# Patient Record
Sex: MALE | Race: WHITE | Employment: FULL TIME | URBAN - METROPOLITAN AREA
[De-identification: names, ages, dates, MRNs, and addresses within clinical notes are randomized per-mention and may not be internally consistent; named-entity substitution may affect disease eponyms.]

---

## 2017-04-27 DIAGNOSIS — I10 ESSENTIAL HYPERTENSION: Primary | ICD-10-CM

## 2017-04-27 RX ORDER — AMLODIPINE AND BENAZEPRIL HYDROCHLORIDE 5; 10 MG/1; MG/1
CAPSULE ORAL
Qty: 30 CAPSULE | Refills: 0 | Status: SHIPPED | OUTPATIENT
Start: 2017-04-27 | End: 2017-05-17

## 2017-04-27 NOTE — TELEPHONE ENCOUNTER
Call pt. Not seen since 2015. WIll refill med for 30 days. Will need to be seen back in clinic in the next 30 days to review BP. If OK, will then refill med longterm as appropriate

## 2017-04-27 NOTE — TELEPHONE ENCOUNTER
Routing refill request to provider for review/approval because:  Labs out of range:  K and Cr last in 2015   Patient needs to be seen because it has been more than 1 year since last office visit.  Last office visit in 2015.

## 2017-04-27 NOTE — TELEPHONE ENCOUNTER
amLODIPine-benazepril (LOTREL) 5-10 MG per capsule      Last Written Prescription Date: 3/11/2016  Last Fill Quantity: 90, # refills: 3    Last Office Visit with [facility], [facility] or [facility] prescribing provider:  9/10/2016   Future Office Visit:        BP Readings from Last 3 Encounters:   09/10/15 114/80   03/02/15 128/80   01/30/15 138/90

## 2017-05-04 DIAGNOSIS — I10 ESSENTIAL HYPERTENSION: ICD-10-CM

## 2017-05-04 RX ORDER — AMLODIPINE AND BENAZEPRIL HYDROCHLORIDE 5; 10 MG/1; MG/1
CAPSULE ORAL
Qty: 90 CAPSULE | Refills: 3 | OUTPATIENT
Start: 2017-05-04

## 2017-05-17 DIAGNOSIS — I10 ESSENTIAL HYPERTENSION: ICD-10-CM

## 2017-05-18 RX ORDER — AMLODIPINE AND BENAZEPRIL HYDROCHLORIDE 5; 10 MG/1; MG/1
CAPSULE ORAL
Qty: 30 CAPSULE | Refills: 0 | Status: SHIPPED | OUTPATIENT
Start: 2017-05-18 | End: 2017-05-30

## 2017-05-18 NOTE — TELEPHONE ENCOUNTER
amLODIPine-benazepril (LOTREL) 5-10 MG per capsule      Last Written Prescription Date: 4/27/2017  Last Fill Quantity: 30, # refills: 0  Last Office Visit with [facility], P or [facility] prescribing provider: 9/10/2015  Next 5 appointments (look out 90 days)     May 30, 2017  3:00 PM CDT   Office Visit with Roger [facility]o MD   [facility]    [address] [place] [address] [phone]                   Potassium   Date Value Ref Range Status   02/21/2015 4.4 3.4 - 5.3 mmol/L Final     Creatinine   Date Value Ref Range Status   02/21/2015 0.85 0.66 - 1.25 mg/dL Final     BP Readings from Last 3 Encounters:   09/10/15 114/80   03/02/15 128/80   01/30/15 138/90

## 2017-05-18 NOTE — TELEPHONE ENCOUNTER
Routing refill request to provider for review/approval because:  Pamela given x1 and patient did not follow up, please advise  Labs not current:  K and Cr   Patient needs to be seen because it has been more than 1 year since last office visit.

## 2017-05-19 NOTE — TELEPHONE ENCOUNTER
Not seen since 2015. Was instructed in April of need for appt. Has now scheduled appt for 5/30/17. Will refill BP med for 30 days only  to cover until pt seen. Will address more longterm refills at that time as appropriate based on BP reading then and physical exam. Inform pt

## 2017-05-30 ENCOUNTER — OFFICE VISIT (OUTPATIENT)
Dept: INTERNAL MEDICINE | Facility: CLINIC | Age: 52
End: 2017-05-30
Payer: COMMERCIAL

## 2017-05-30 VITALS
HEIGHT: 70 IN | WEIGHT: 239 LBS | SYSTOLIC BLOOD PRESSURE: 112 MMHG | BODY MASS INDEX: 34.22 KG/M2 | DIASTOLIC BLOOD PRESSURE: 76 MMHG | HEART RATE: 84 BPM | TEMPERATURE: 98.5 F | OXYGEN SATURATION: 97 %

## 2017-05-30 DIAGNOSIS — I10 ESSENTIAL HYPERTENSION: ICD-10-CM

## 2017-05-30 DIAGNOSIS — Z11.59 NEED FOR HEPATITIS C SCREENING TEST: ICD-10-CM

## 2017-05-30 DIAGNOSIS — Z12.5 SCREENING FOR PROSTATE CANCER: Primary | ICD-10-CM

## 2017-05-30 DIAGNOSIS — E66.9 NON MORBID OBESITY, UNSPECIFIED OBESITY TYPE: ICD-10-CM

## 2017-05-30 LAB
ANION GAP SERPL CALCULATED.3IONS-SCNC: 10 MMOL/L (ref 3–14)
BUN SERPL-MCNC: 18 MG/DL (ref 7–30)
CALCIUM SERPL-MCNC: 8.8 MG/DL (ref 8.5–10.1)
CHLORIDE SERPL-SCNC: 108 MMOL/L (ref 94–109)
CO2 SERPL-SCNC: 25 MMOL/L (ref 20–32)
CREAT SERPL-MCNC: 0.85 MG/DL (ref 0.66–1.25)
GFR SERPL CREATININE-BSD FRML MDRD: NORMAL ML/MIN/1.7M2
GLUCOSE SERPL-MCNC: 85 MG/DL (ref 70–99)
POTASSIUM SERPL-SCNC: 4 MMOL/L (ref 3.4–5.3)
PSA SERPL-ACNC: 0.47 UG/L (ref 0–4)
SODIUM SERPL-SCNC: 143 MMOL/L (ref 133–144)

## 2017-05-30 PROCEDURE — 36415 COLL VENOUS BLD VENIPUNCTURE: CPT | Performed by: INTERNAL MEDICINE

## 2017-05-30 PROCEDURE — G0103 PSA SCREENING: HCPCS | Performed by: INTERNAL MEDICINE

## 2017-05-30 PROCEDURE — 99214 OFFICE O/P EST MOD 30 MIN: CPT | Performed by: INTERNAL MEDICINE

## 2017-05-30 PROCEDURE — 80048 BASIC METABOLIC PNL TOTAL CA: CPT | Performed by: INTERNAL MEDICINE

## 2017-05-30 PROCEDURE — 86803 HEPATITIS C AB TEST: CPT | Performed by: INTERNAL MEDICINE

## 2017-05-30 RX ORDER — AMLODIPINE AND BENAZEPRIL HYDROCHLORIDE 5; 10 MG/1; MG/1
1 CAPSULE ORAL DAILY
Qty: 90 CAPSULE | Refills: 3 | Status: SHIPPED | OUTPATIENT
Start: 2017-05-30 | End: 2018-06-12

## 2017-05-30 NOTE — MR AVS SNAPSHOT
"              After Visit Summary   5/30/2017    Rhys Manley    MRN: [id number]           Patient Information     Date Of Birth          1965        Visit Information        Provider Department      5/30/2017 3:00 PM Roger Brito MD [facility]        Today's Diagnoses     Screening for prostate cancer    -  1    Essential hypertension        Need for hepatitis C screening test        Non morbid obesity, unspecified obesity type          Care Instructions    Continue current meds  Prescriptions refilled.    Labs today as ordered  Calorie/carbohydrate (sugar, bread, potato, pasta, etc) reduction in diet for weight loss.  Increase color on your plate with fruits and vegetables. Increase  frequency of walking or other aerobic exercise as able (goal is daily)  See me in 1 year, earlier as needed                 Follow-ups after your visit        Who to contact     If you have questions or need follow up information about today's clinic visit or your schedule please contact [facility] directly at [phone].  Normal or non-critical lab and imaging results will be communicated to you by [employer], letter or phone within 4 business days after the clinic has received the results. If you do not hear from us within 7 days, please contact the clinic through [employer] or phone. If you have a critical or abnormal lab result, we will notify you by phone as soon as possible.  Submit refill requests through [employer] or call your pharmacy and they will forward the refill request to us. Please allow 3 business days for your refill to be completed.          Additional Information About Your Visit        [employer] Information     [employer] lets you send messages to your doctor, view your test results, renew your prescriptions, schedule appointments and more. To sign up, go to [email or web address] . Click on \"Log in\" on the left side of the screen, which will take you to the Welcome " "page. Then click on \"Sign up Now\" on the right side of the page.     You will be asked to enter the access code listed below, as well as some personal information. Please follow the directions to create your username and password.     Your access code is: [email or web address]  Expires: 2017  3:31 PM     Your access code will  in 90 days. If you need help or a new code, please call your [place] clinic or [phone].        Care EveryWhere ID     This is your Care EveryWhere ID. This could be used by other organizations to access your [place] medical records  [id number]        Your Vitals Were     Pulse Temperature Height Pulse Oximetry BMI (Body Mass Index)       84 98.5  F (36.9  C) (Oral) 5' 10\" (1.778 m) 97% 34.29 kg/m2        Blood Pressure from Last 3 Encounters:   17 112/76   09/10/15 114/80   03/02/15 128/80    Weight from Last 3 Encounters:   17 239 lb (108.4 kg)   09/10/15 237 lb (107.5 kg)   03/02/15 237 lb (107.5 kg)              We Performed the Following     Basic metabolic panel     Hepatitis C Screen Reflex to HCV RNA Quant and Genotype     Prostate spec antigen screen          Today's Medication Changes          These changes are accurate as of: 17  3:32 PM.  If you have any questions, ask your nurse or doctor.               These medicines have changed or have updated prescriptions.        Dose/Directions    amLODIPine-benazepril 5-10 MG per capsule   Commonly known as:  LOTREL   This may have changed:  See the new instructions.   Used for:  Essential hypertension   Changed by:  Roger Brito MD        Dose:  1 capsule   Take 1 capsule by mouth daily   Quantity:  90 capsule   Refills:  3         Stop taking these medicines if you haven't already. Please contact your care team if you have questions.     fluticasone 50 MCG/ACT spray   Commonly known as:  FLONASE   Stopped by:  Roger rBito MD           PROVENTIL  (90 BASE) MCG/ACT Inhaler   Generic drug:  albuterol "   Stopped by:  Roger Brito MD           valACYclovir 1000 mg tablet   Commonly known as:  VALTREX   Stopped by:  Roger Brito MD                Where to get your medicines      These medications were sent to [place] [facility] [address] [facility] - [place], [place] - [address]  [address], [facility] [address]     Phone:  [phone]     amLODIPine-benazepril 5-10 MG per capsule                Primary Care Provider Office Phone # Fax #    Roger Brito [phone]       [facility] [address]  [facility] [address]        Thank you!     Thank you for choosing [facility]  for your care. Our goal is always to provide you with excellent care. Hearing back from our patients is one way we can continue to improve our services. Please take a few minutes to complete the written survey that you may receive in the mail after your visit with us. Thank you!             Your Updated Medication List - Protect others around you: Learn how to safely use, store and throw away your medicines at www.disposemymeds.org.          This list is accurate as of: 5/30/17  3:32 PM.  Always use your most recent med list.                   Brand Name Dispense Instructions for use    amLODIPine-benazepril 5-10 MG per capsule    LOTREL    90 capsule    Take 1 capsule by mouth daily       MULTIVITAMINS PO      Take  by mouth daily.       triamcinolone 0.1 % cream    KENALOG    45 g    Apply sparingly to affected area twice a day as needed for rash

## 2017-05-30 NOTE — NURSING NOTE
"Chief Complaint   Patient presents with     Hypertension       Initial /76  Pulse 100  Temp 99.3  F (37.4  C) (Oral)  Ht 5' 10\" (1.778 m)  Wt 239 lb (108.4 kg)  SpO2 97%  BMI 34.29 kg/m2 Estimated body mass index is 34.29 kg/(m^2) as calculated from the following:    Height as of this encounter: 5' 10\" (1.778 m).    Weight as of this encounter: 239 lb (108.4 kg).  Medication Reconciliation: complete  "

## 2017-05-30 NOTE — PATIENT INSTRUCTIONS
Continue current meds  Prescriptions refilled.    Labs today as ordered  Calorie/carbohydrate (sugar, bread, potato, pasta, etc) reduction in diet for weight loss.  Increase color on your plate with fruits and vegetables. Increase  frequency of walking or other aerobic exercise as able (goal is daily)  See me in 1 year, earlier as needed

## 2017-05-30 NOTE — LETTER
[facility]  [address] [address]  [phone]      6/3/2017       Rhys Manley  [address] [facility] [address]        Dear Rhys,  Enclosed are a printed copy of your most recent  lab results.   Unless commented on below, mild variation of results  outside the normal range are not clinically signicant.    Electrolyte, Glucose/Sugar, Kidney function and Prostate cancer screening lab results were normal.  No evidence of hepatitis C infection.  Continue current medication.  Please contact your pharmacy if you need further refills of your medication.  Please make an appointment to see me in 1 year  for follow-up of your blood pressure or earlier as needed.    If you have further questions/concerns regarding the results, I would ask that you bring them to your next follow-up appointment with me and I would be happy to review them with you further.        Sincerely,      Roger Brito MD  Internal Medicine

## 2017-05-30 NOTE — PROGRESS NOTES
"  SUBJECTIVE:                                                    Rhys Manley is a 52 year old male who presents to clinic today for the following health issues:      Hypertension Follow-up      Outpatient blood pressures are not being checked.    Low Salt Diet: no added salt       Amount of exercise or physical activity: 6-7 days/week for an average of 45-60 minutes    Problems taking medications regularly: No    Medication side effects: none    Diet: regular (no restrictions)    Pt's past medical history, family history, habits, medications and allergies were reviewed with the patient today.  See snap shot for  HCM status. Most recent lab results reviewed with pt. Problem list and histories reviewed & adjusted, as indicated.  Additional history as below:    Denies chest pain, shortness of breath, abdominal pain, headache, vision changes or side effects with medications.  Weight up 2 pounds in 2 years. Not following particular diet.  Exercise as above     Additional ROS:   Constitutional, HEENT, Cardiovascular, Pulmonary, GI and , Neuro, MSK and Psych review of systems/symptoms are otherwise negative or unchanged from previous, except as noted above.      OBJECTIVE:  /76  Pulse 84  Temp 98.5  F (36.9  C) (Oral)  Ht 5' 10\" (1.778 m)  Wt 239 lb (108.4 kg)  SpO2 97%  BMI 34.29 kg/m2   Estimated body mass index is 34.29 kg/(m^2) as calculated from the following:    Height as of this encounter: 5' 10\" (1.778 m).    Weight as of this encounter: 239 lb (108.4 kg).  Eye: PERRL, EOMI  HENT: ear canals and TM's normal and nose and mouth without ulcers or lesions   Neck: no adenopathy. Thyroid normal to palpation. No bruits  Pulm: Lungs clear to auscultation   CV: Regular rates and rhythm  GI: Soft, obese, nontender, Normal active bowel sounds, No hepatosplenomegaly or masses palpable  Ext: Peripheral pulses intact. No edema.  Neuro: Normal strength and tone, sensory exam grossly normal  Rectal: prostate " symmetric w/o nodularity, no masses palpated, stool guaiac negative and prostate 1+       Assessment/Plan: (See plan discussion below for further details)  1. Essential hypertension  Well-controlled. Continue current medication  - amLODIPine-benazepril (LOTREL) 5-10 MG per capsule; Take 1 capsule by mouth daily  Dispense: 90 capsule; Refill: 3  - Basic metabolic panel    2. Screening for prostate cancer   PSA ordered for intermittent monitoring after discussion with pt and pt preference. Not doing annual PSA per USPTF recs  - Prostate spec antigen screen    3. Need for hepatitis C screening test  Pt meets criteria for hepatitis C screening based on birth year 1946-65. Discussed pro/cons of Hep C screening/treatment and recs of USPTF. Pt agreeable to screening  - Hepatitis C Screen Reflex to HCV RNA Quant and Genotype    4. Non morbid obesity, unspecified obesity type  See below regarding counseling for diet and exercise    Plan discussion:  Continue current meds  Prescriptions refilled.    Labs today as ordered  Calorie/carbohydrate (sugar, bread, potato, pasta, etc) reduction in diet for weight loss.  Increase color on your plate with fruits and vegetables. Increase  frequency of walking or other aerobic exercise as able (goal is daily)  See me in 1 year, earlier as needed       Roger Brito MD  Internal Medicine Department  [facility]

## 2017-06-01 LAB — HCV AB SERPL QL IA: NORMAL

## 2017-06-20 DIAGNOSIS — L30.9 ECZEMA: ICD-10-CM

## 2017-06-21 RX ORDER — TRIAMCINOLONE ACETONIDE 1 MG/G
CREAM TOPICAL
Qty: 45 G | Refills: 1 | Status: SHIPPED | OUTPATIENT
Start: 2017-06-21 | End: 2020-01-23

## 2018-06-12 DIAGNOSIS — I10 ESSENTIAL HYPERTENSION: ICD-10-CM

## 2018-06-12 RX ORDER — AMLODIPINE AND BENAZEPRIL HYDROCHLORIDE 5; 10 MG/1; MG/1
CAPSULE ORAL
Qty: 30 CAPSULE | Refills: 0 | Status: SHIPPED | OUTPATIENT
Start: 2018-06-12 | End: 2018-08-19

## 2018-06-12 NOTE — LETTER
[facility]  [address] [id number] [phone]            Rhys Manley  [address] [facility] [address]        June 12, 2018    Dear Rhys,    While refilling your prescription today, we noticed that you are due for an appointment with your provider.  We will refill your prescription for 30 days, but a follow-up appointment must be made before any additional refills can be approved.     Taking care of your health is important to us and we look forward to seeing you in the near future.  Please call us at [phone] or [phone] (or use Stylyt) to schedule an appointment.     Please disregard this notice if you have already made an appointment.    Sincerely,        [facility]

## 2018-08-19 DIAGNOSIS — I10 ESSENTIAL HYPERTENSION: ICD-10-CM

## 2018-08-19 NOTE — TELEPHONE ENCOUNTER
"Requested Prescriptions   Pending Prescriptions Disp Refills     amLODIPine-benazepril (LOTREL) 5-10 MG per capsule [Pharmacy Med Name: AMLODIPINE-BENAZEPRIL 5-10 MG] 30 capsule 0    Last Written Prescription Date:  6/12/2018  Last Fill Quantity: 30,  # refills: 0   Last office visit: 5/30/2017 with prescribing provider:  5/30/2017   Future Office Visit:     Sig: TAKE 1 CAPSULE BY MOUTH EVERY DAY    Calcium Channel Blockers Protocol  Failed    8/19/2018  1:26 AM       Failed - Blood pressure under 140/90 in past 12 months    BP Readings from Last 3 Encounters:   05/30/17 112/76   09/10/15 114/80   03/02/15 128/80                Failed - Recent (12 mo) or future (30 days) visit within the authorizing provider's specialty    Patient had office visit in the last 12 months or has a visit in the next 30 days with authorizing provider or within the authorizing provider's specialty.  See \"Patient Info\" tab in inbasket, or \"Choose Columns\" in Meds & Orders section of the refill encounter.           Failed - Normal serum creatinine on file in past 12 months    Recent Labs   Lab Test  05/30/17   1537   CR  0.85            Passed - Patient is age 18 or older          "

## 2018-08-21 NOTE — TELEPHONE ENCOUNTER
Last seen in May 2017. Med refilled 2 mos ago for 30 days and pt sent letter stating need for appt in the next 30 days. No appt made and pt out of med > 1 month ago if had been using daily when refilled in June. Call pt to schedule appt with me in the next 30 days and then route RF request back to me to address medication until seen back in clinic

## 2018-08-21 NOTE — TELEPHONE ENCOUNTER
Routing refill request to provider for review/approval because:  Pamela given x1 and patient did not follow up, please advise

## 2018-08-23 DIAGNOSIS — I10 ESSENTIAL HYPERTENSION: ICD-10-CM

## 2018-08-23 RX ORDER — AMLODIPINE AND BENAZEPRIL HYDROCHLORIDE 5; 10 MG/1; MG/1
CAPSULE ORAL
Qty: 30 CAPSULE | Refills: 0 | Status: CANCELLED | OUTPATIENT
Start: 2018-08-23

## 2018-08-23 RX ORDER — AMLODIPINE AND BENAZEPRIL HYDROCHLORIDE 5; 10 MG/1; MG/1
CAPSULE ORAL
Qty: 30 CAPSULE | Refills: 0 | Status: SHIPPED | OUTPATIENT
Start: 2018-08-23 | End: 2018-09-10 | Stop reason: DRUGHIGH

## 2018-08-23 NOTE — TELEPHONE ENCOUNTER
/Requested Prescriptions/   Pending Prescriptions Disp Refills     amLODIPine-benazepril (LOTREL) 5-10 MG per capsule 30 capsule 0    There is no refill protocol information for this order      Last Written Prescription Date:  06/12/18  Last Fill Quantity: 30,  # refills: 0   Last office visit: 5/30/2017 with prescribing provider:  05/30/17   Future Office Visit: 09/10/18  Next 5 appointments (look out 90 days)     Sep 10, 2018  3:00 PM CDT   Office Visit with Roger Brito MD   [facility]    [address] [address] [phone]

## 2018-09-06 NOTE — PROGRESS NOTES
"  SUBJECTIVE:   Rhys Manley is a 53 year old male who presents to clinic today for the following health issues:    Chief Complaint   Patient presents with     Hypertension       Hypertension Follow-up      Outpatient blood pressures are not being checked.    Low Salt Diet: no added salt      Amount of exercise or physical activity: Work is Physical    Problems taking medications regularly: No    Medication side effects: none    Diet: no added salt      Pt's past medical history, family history, habits, medications and allergies were reviewed with the patient today.  See snap shot for  HCM status. Most recent lab results reviewed with pt. Problem list and histories reviewed & adjusted, as indicated.  Additional history as below:    Denies chest pain, shortness of breath, abdominal pain, vision changes or side effects with medications. Very rare headache. Has some stress at home  with relationship  between wife and son.   Denies actual depression   Occ snoring. Mild fatigue in the day. No naps     Additional ROS:   Constitutional, HEENT, Cardiovascular, Pulmonary, GI and , Neuro, MSK and Psych review of systems/symptoms are otherwise negative or unchanged from previous, except as noted above.      OBJECTIVE:  /88  Pulse 94  Temp 99.5  F (37.5  C) (Oral)  Resp 16  Wt 243 lb (110.2 kg)  SpO2 99%  BMI 34.87 kg/m2   Estimated body mass index is 34.87 kg/(m^2) as calculated from the following:    Height as of 5/30/17: 5' 10\" (1.778 m).    Weight as of this encounter: 243 lb (110.2 kg).  Eye: PERRL, EOMI  HENT: ear canals and TM's normal and nose and mouth without ulcers or lesions. Mld airway narrowing due to weight   Neck: no adenopathy. Thyroid normal to palpation. No bruits  Pulm: Lungs clear to auscultation   CV: obese, Regular rates and rhythm  GI: Soft, nontender, Normal active bowel sounds, No hepatosplenomegaly or masses palpable  Ext: Peripheral pulses intact. No edema.  Neuro: Normal strength and " tone, sensory exam grossly normal  Gen:: Normal affect      Assessment/Plan: (See plan discussion below for further details)  1. Essential hypertension  Needs improved control. See plan discussion below.   - Lipid panel reflex to direct LDL Fasting; Future  - Comprehensive metabolic panel; Future  - amLODIPine-benazepril (LOTREL) 5-20 MG per capsule; Take 1 capsule by mouth daily  Dispense: 30 capsule; Refill: 11    2. Class 1 obesity with serious comorbidity and body mass index (BMI) of 34.0 to 34.9 in adult, unspecified obesity type  See below re: counseling of diet/exercise    3. Screening for HIV (human immunodeficiency virus)   HIV screening recommendation per USPSTF guideline discussed with pt. Pt agrees.   - HIV Screening; Future    Plan discussion:   Change Amlod/Benaz to 5/20mg capsule, 1 capsule daily in AM for blood pressure   Fasting labs in the next 1-2 weeks. For fasting labs, please refrain from eating for 8 hours or more.  Be sure to  drink water and take your  medications the day of the test.   Reduce caffeine  Reduce carbohydrate (sugars, starchy foods such as bread, rice, potato, pasta, etc) intake  in your diet and increase the amount of color on your plate with fruits, vegetables and lean meats.   Have wife watch to see if  any apnea/stopping of breathing with sleep at night or if take a nap. Inform MD in future  See me in 4-6 weeks for follow-up of blood pressure   Flu shot at work       Roger Brito MD  Internal Medicine Department  [facility]

## 2018-09-10 ENCOUNTER — OFFICE VISIT (OUTPATIENT)
Dept: INTERNAL MEDICINE | Facility: CLINIC | Age: 53
End: 2018-09-10
Payer: COMMERCIAL

## 2018-09-10 VITALS
RESPIRATION RATE: 16 BRPM | DIASTOLIC BLOOD PRESSURE: 88 MMHG | HEART RATE: 94 BPM | TEMPERATURE: 99.5 F | WEIGHT: 243 LBS | BODY MASS INDEX: 34.87 KG/M2 | OXYGEN SATURATION: 99 % | SYSTOLIC BLOOD PRESSURE: 138 MMHG

## 2018-09-10 DIAGNOSIS — E66.811 CLASS 1 OBESITY WITH SERIOUS COMORBIDITY AND BODY MASS INDEX (BMI) OF 34.0 TO 34.9 IN ADULT, UNSPECIFIED OBESITY TYPE: ICD-10-CM

## 2018-09-10 DIAGNOSIS — Z11.4 SCREENING FOR HIV (HUMAN IMMUNODEFICIENCY VIRUS): ICD-10-CM

## 2018-09-10 DIAGNOSIS — I10 ESSENTIAL HYPERTENSION: Primary | ICD-10-CM

## 2018-09-10 PROCEDURE — 99214 OFFICE O/P EST MOD 30 MIN: CPT | Performed by: INTERNAL MEDICINE

## 2018-09-10 RX ORDER — AMLODIPINE AND BENAZEPRIL HYDROCHLORIDE 5; 20 MG/1; MG/1
1 CAPSULE ORAL DAILY
Qty: 30 CAPSULE | Refills: 11 | Status: SHIPPED | OUTPATIENT
Start: 2018-09-10 | End: 2018-12-13

## 2018-09-10 RX ORDER — AMLODIPINE AND BENAZEPRIL HYDROCHLORIDE 5; 10 MG/1; MG/1
CAPSULE ORAL
Qty: 30 CAPSULE | Refills: 0 | Status: CANCELLED | OUTPATIENT
Start: 2018-09-10

## 2018-09-10 NOTE — MR AVS SNAPSHOT
After Visit Summary   9/10/2018    Rhys Manley    MRN: [id number]           Patient Information     Date Of Birth          1965        Visit Information        Provider Department      9/10/2018 3:00 PM Roger Brito MD [facility]        Today's Diagnoses     Screening for HIV (human immunodeficiency virus)    -  1    Essential hypertension          Care Instructions     Change Amlod/Benaz to 5/20mg capsule, 1 capsule daily in AM for blood pressure   Fasting labs in the next 1-2 weeks. For fasting labs, please refrain from eating for 8 hours or more.  Be sure to  drink water and take your  medications the day of the test.   Reduce caffeine  Reduce carbohydrate (sugars, starchy foods such as bread, rice, potato, pasta, etc) intake  in your diet and increase the amount of color on your plate with fruits, vegetables and lean meats.   Have wife watch to see if  any apnea/stopping of breathing with sleep at night or if take a nap. Inform MD in future  See me in 4-6 weeks for follow-up of blood pressure   Flu shot at work               Follow-ups after your visit        Future tests that were ordered for you today     Open Future Orders        Priority Expected Expires Ordered    HIV Screening Routine 9/17/2018 9/10/2019 9/10/2018    Lipid panel reflex to direct LDL Fasting Routine 9/17/2018 9/10/2019 9/10/2018    Comprehensive metabolic panel Routine 9/17/2018 9/10/2019 9/10/2018            Who to contact     If you have questions or need follow up information about today's clinic visit or your schedule please contact [facility] directly at [phone].  Normal or non-critical lab and imaging results will be communicated to you by MyChart, letter or phone within 4 business days after the clinic has received the results. If you do not hear from us within 7 days, please contact the clinic through MyChart or phone. If you have a critical or abnormal lab  result, we will notify you by phone as soon as possible.  Submit refill requests through [employer] or call your pharmacy and they will forward the refill request to us. Please allow 3 business days for your refill to be completed.          Additional Information About Your Visit        Care EveryWhere ID     This is your Care EveryWhere ID. This could be used by other organizations to access your [place] medical records  [id number]        Your Vitals Were     Pulse Temperature Respirations Pulse Oximetry BMI (Body Mass Index)       94 99.5  F (37.5  C) (Oral) 16 99% 34.87 kg/m2        Blood Pressure from Last 3 Encounters:   09/10/18 138/88   05/30/17 112/76   09/10/15 114/80    Weight from Last 3 Encounters:   09/10/18 243 lb (110.2 kg)   05/30/17 239 lb (108.4 kg)   09/10/15 237 lb (107.5 kg)                 Today's Medication Changes          These changes are accurate as of 9/10/18  3:34 PM.  If you have any questions, ask your nurse or doctor.               Start taking these medicines.        Dose/Directions    amLODIPine-benazepril 5-20 MG per capsule   Commonly known as:  LOTREL   Used for:  Essential hypertension   Replaces:  amLODIPine-benazepril 5-10 MG per capsule   Started by:  Roger Brito MD        Dose:  1 capsule   Take 1 capsule by mouth daily   Quantity:  30 capsule   Refills:  11         Stop taking these medicines if you haven't already. Please contact your care team if you have questions.     amLODIPine-benazepril 5-10 MG per capsule   Commonly known as:  LOTREL   Replaced by:  amLODIPine-benazepril 5-20 MG per capsule   Stopped by:  Roger Brito MD                Where to get your medicines      These medications were sent to [id number] - [place], [place] - [address]   [address] [facility] [address]     Phone:  [phone]     amLODIPine-benazepril 5-20 MG per capsule                Primary Care Provider Office Phone # Fax #    Roger Brito [phone]  [phone]       [address] [facility] [address]        Equal Access to Services     BUSHRA POND : Hadii annabel rosario libby Sorylan, waaxda luqadaha, qaybta kaalmada sue, saúl nikkiein hayaajordan ndiayejose song katie corbin. So [facility] [phone].    ATENCIÓN: Si habla español, tiene a patel disposición servicios gratuitos de asistencia lingüística. Llame al [phone].    We comply with applicable federal civil rights laws and [place] laws. We do not discriminate on the basis of race, color, national origin, age, disability, sex, sexual orientation, or gender identity.            Thank you!     Thank you for choosing [facility]  for your care. Our goal is always to provide you with excellent care. Hearing back from our patients is one way we can continue to improve our services. Please take a few minutes to complete the written survey that you may receive in the mail after your visit with us. Thank you!             Your Updated Medication List - Protect others around you: Learn how to safely use, store and throw away your medicines at www.disposemymeds.org.          This list is accurate as of 9/10/18  3:34 PM.  Always use your most recent med list.                   Brand Name Dispense Instructions for use Diagnosis    amLODIPine-benazepril 5-20 MG per capsule    LOTREL    30 capsule    Take 1 capsule by mouth daily    Essential hypertension       MULTIVITAMINS PO      Take  by mouth daily.        triamcinolone 0.1 % cream    KENALOG    45 g    APPLY SPARINGLY TO AFFECTED AREA(S) TWICE DAILY AS NEEDED FOR RASH    Eczema

## 2018-09-10 NOTE — PATIENT INSTRUCTIONS
Change Amlod/Benaz to 5/20mg capsule, 1 capsule daily in AM for blood pressure   Fasting labs in the next 1-2 weeks. For fasting labs, please refrain from eating for 8 hours or more.  Be sure to  drink water and take your  medications the day of the test.   Reduce caffeine  Reduce carbohydrate (sugars, starchy foods such as bread, rice, potato, pasta, etc) intake  in your diet and increase the amount of color on your plate with fruits, vegetables and lean meats.   Have wife watch to see if  any apnea/stopping of breathing with sleep at night or if take a nap. Inform MD in future  See me in 4-6 weeks for follow-up of blood pressure   Flu shot at work

## 2018-10-24 DIAGNOSIS — I10 ESSENTIAL HYPERTENSION: ICD-10-CM

## 2018-10-24 RX ORDER — AMLODIPINE AND BENAZEPRIL HYDROCHLORIDE 5; 20 MG/1; MG/1
1 CAPSULE ORAL DAILY
Qty: 30 CAPSULE | Refills: 0 | OUTPATIENT
Start: 2018-10-24

## 2018-11-19 DIAGNOSIS — I10 ESSENTIAL HYPERTENSION: ICD-10-CM

## 2018-11-19 DIAGNOSIS — Z11.4 SCREENING FOR HIV (HUMAN IMMUNODEFICIENCY VIRUS): ICD-10-CM

## 2018-11-19 LAB
ALBUMIN SERPL-MCNC: 3.8 G/DL (ref 3.4–5)
ALP SERPL-CCNC: 53 U/L (ref 40–150)
ALT SERPL W P-5'-P-CCNC: 28 U/L (ref 0–70)
ANION GAP SERPL CALCULATED.3IONS-SCNC: 6 MMOL/L (ref 3–14)
AST SERPL W P-5'-P-CCNC: 17 U/L (ref 0–45)
BILIRUB SERPL-MCNC: 0.7 MG/DL (ref 0.2–1.3)
BUN SERPL-MCNC: 13 MG/DL (ref 7–30)
CALCIUM SERPL-MCNC: 9 MG/DL (ref 8.5–10.1)
CHLORIDE SERPL-SCNC: 107 MMOL/L (ref 94–109)
CHOLEST SERPL-MCNC: 135 MG/DL
CO2 SERPL-SCNC: 27 MMOL/L (ref 20–32)
CREAT SERPL-MCNC: 0.91 MG/DL (ref 0.66–1.25)
GFR SERPL CREATININE-BSD FRML MDRD: 87 ML/MIN/1.7M2
GLUCOSE SERPL-MCNC: 99 MG/DL (ref 70–99)
HDLC SERPL-MCNC: 39 MG/DL
LDLC SERPL CALC-MCNC: 68 MG/DL
NONHDLC SERPL-MCNC: 96 MG/DL
POTASSIUM SERPL-SCNC: 5.1 MMOL/L (ref 3.4–5.3)
PROT SERPL-MCNC: 7.1 G/DL (ref 6.8–8.8)
SODIUM SERPL-SCNC: 140 MMOL/L (ref 133–144)
TRIGL SERPL-MCNC: 139 MG/DL

## 2018-11-19 PROCEDURE — 36415 COLL VENOUS BLD VENIPUNCTURE: CPT | Performed by: INTERNAL MEDICINE

## 2018-11-19 PROCEDURE — 80061 LIPID PANEL: CPT | Performed by: INTERNAL MEDICINE

## 2018-11-19 PROCEDURE — 80053 COMPREHEN METABOLIC PANEL: CPT | Performed by: INTERNAL MEDICINE

## 2018-11-19 PROCEDURE — 87389 HIV-1 AG W/HIV-1&-2 AB AG IA: CPT | Performed by: INTERNAL MEDICINE

## 2018-11-20 LAB — HIV 1+2 AB+HIV1 P24 AG SERPL QL IA: NONREACTIVE

## 2018-12-12 NOTE — PROGRESS NOTES
.  SUBJECTIVE:   Rhys Manley is a 53 year old male who presents to clinic today for the following health issues:    Chief Complaint   Patient presents with     Hypertension       Hypertension Follow-up      Outpatient blood pressures are not being checked.    Low Salt Diet: no added salt      Amount of exercise or physical activity: None, Physical Work    Problems taking medications regularly: No    Medication side effects: none    Diet: regular (no restrictions)    Pt's past medical history, family history, habits, medications and allergies were reviewed with the patient today.  See snap shot for  HCM status. Most recent lab results reviewed with pt. Problem list and histories reviewed & adjusted, as indicated.  Additional history as below:    Lab Results   Component Value Date    GLC 99 11/19/2018     No results found for: A1C  Lab Results   Component Value Date    CHOL 135 11/19/2018     Lab Results   Component Value Date    LDL 68 11/19/2018     Lab Results   Component Value Date    HDL 39 11/19/2018     Lab Results   Component Value Date    TRIG 139 11/19/2018     Lab Results   Component Value Date    CR 0.91 11/19/2018     Lab Results   Component Value Date    ALT 28 11/19/2018     Lab Results   Component Value Date    AST 17 11/19/2018     Lab Results   Component Value Date    MICROL  07/09/2012     <5  Urine Microalbumin lowest reportable value has been changed from 2 mg/L to 5   mg/L due to a methodology change on April 16,2012.     Lab Results   Component Value Date    TSH 0.82 02/21/2015       Denies chest pain, shortness of breath, abdominal pain, headache, vision changes or side effects with medications.  Weight down a couple pounds. Has started to modify diet and eat less     Additional ROS:   Constitutional, HEENT, Cardiovascular, Pulmonary, GI and , Neuro, MSK and Psych review of systems/symptoms are otherwise negative or unchanged from previous, except as noted above.      OBJECTIVE:  /80   " Pulse 80   Temp 98.1  F (36.7  C) (Oral)   Resp 16   Wt 109.3 kg (241 lb)   SpO2 96%   BMI 34.58 kg/m     Estimated body mass index is 34.58 kg/m  as calculated from the following:    Height as of 5/30/17: 1.778 m (5' 10\").    Weight as of this encounter: 109.3 kg (241 lb).     Neck: no adenopathy. Thyroid normal to palpation. No bruits  Pulm: Lungs clear to auscultation   CV: Regular rates and rhythm  GI: Soft, nontender, Normal active bowel sounds   Ext: Peripheral pulses intact. No edema.     Assessment/Plan: (See plan discussion below for further details)   1. Essential hypertension  controlled  - amLODIPine-benazepril (LOTREL) 5-20 MG capsule; Take 1 capsule by mouth daily  Dispense: 90 capsule; Refill: 3  - Basic metabolic panel; Future    Plan discussion:  Continue current meds  Prescriptions refilled.    Call  [phone] or use [employer] to schedule a future lab appointment  fasting in 1 year.   Follow up with me a few days after these future labs are drawn to review results and other medical issues as necessary  Continue diet/exercise for further weight loss   Check with insurance or speak with your pharmacist re: Shingrix vaccine coverage for shingles prevention.  This is a 2 shot series done 2-6 months apart       Roger Brito MD  Internal Medicine Department  [facility]      ]  "

## 2018-12-13 ENCOUNTER — OFFICE VISIT (OUTPATIENT)
Dept: INTERNAL MEDICINE | Facility: CLINIC | Age: 53
End: 2018-12-13
Payer: COMMERCIAL

## 2018-12-13 VITALS
DIASTOLIC BLOOD PRESSURE: 80 MMHG | BODY MASS INDEX: 34.58 KG/M2 | HEART RATE: 80 BPM | RESPIRATION RATE: 16 BRPM | OXYGEN SATURATION: 96 % | TEMPERATURE: 98.1 F | SYSTOLIC BLOOD PRESSURE: 126 MMHG | WEIGHT: 241 LBS

## 2018-12-13 DIAGNOSIS — I10 ESSENTIAL HYPERTENSION: ICD-10-CM

## 2018-12-13 PROCEDURE — 99213 OFFICE O/P EST LOW 20 MIN: CPT | Performed by: INTERNAL MEDICINE

## 2018-12-13 RX ORDER — AMLODIPINE AND BENAZEPRIL HYDROCHLORIDE 5; 20 MG/1; MG/1
1 CAPSULE ORAL DAILY
Qty: 90 CAPSULE | Refills: 3 | Status: SHIPPED | OUTPATIENT
Start: 2018-12-13 | End: 2019-10-15

## 2018-12-13 NOTE — PATIENT INSTRUCTIONS
Continue current meds  Prescriptions refilled.    Call  [phone] or use [employer] to schedule a future lab appointment  fasting in 1 year.   Follow up with me a few days after these future labs are drawn to review results and other medical issues as necessary   Check with insurance or speak with your pharmacist re: Shingrix vaccine coverage for shingles prevention.  This is a 2 shot series done 2-6 months apart

## 2019-10-15 ENCOUNTER — MYC REFILL (OUTPATIENT)
Dept: INTERNAL MEDICINE | Facility: CLINIC | Age: 54
End: 2019-10-15

## 2019-10-15 DIAGNOSIS — I10 ESSENTIAL HYPERTENSION: ICD-10-CM

## 2019-10-15 RX ORDER — AMLODIPINE AND BENAZEPRIL HYDROCHLORIDE 5; 20 MG/1; MG/1
1 CAPSULE ORAL DAILY
Qty: 90 CAPSULE | Refills: 0 | Status: SHIPPED | OUTPATIENT
Start: 2019-10-15 | End: 2020-01-23

## 2019-10-15 NOTE — TELEPHONE ENCOUNTER
"Requested Prescriptions   Pending Prescriptions Disp Refills     amLODIPine-benazepril (LOTREL) 5-20 MG capsule 90 capsule 3     Sig: Take 1 capsule by mouth daily       Calcium Channel Blockers Protocol  Passed - 10/15/2019 11:12 AM        Passed - Blood pressure under 140/90 in past 12 months     BP Readings from Last 3 Encounters:   12/13/18 126/80   09/10/18 138/88   05/30/17 112/76                 Passed - Recent (12 mo) or future (30 days) visit within the authorizing provider's specialty     Patient has had an office visit with the authorizing provider or a provider within the authorizing providers department within the previous 12 mos or has a future within next 30 days. See \"Patient Info\" tab in inbasket, or \"Choose Columns\" in Meds & Orders section of the refill encounter.              Passed - Medication is active on med list        Passed - Patient is age 18 or older        Passed - Normal serum creatinine on file in past 12 months     Recent Labs   Lab Test 11/19/18  0755   CR 0.91               "

## 2019-11-04 ENCOUNTER — HEALTH MAINTENANCE LETTER (OUTPATIENT)
Age: 54
End: 2019-11-04

## 2020-01-23 ENCOUNTER — MYC REFILL (OUTPATIENT)
Dept: INTERNAL MEDICINE | Facility: CLINIC | Age: 55
End: 2020-01-23

## 2020-01-23 DIAGNOSIS — L30.9 ECZEMA: ICD-10-CM

## 2020-01-23 DIAGNOSIS — I10 ESSENTIAL HYPERTENSION: ICD-10-CM

## 2020-01-23 NOTE — LETTER
[facility]  [address] [id number] [phone]            Rhys Manley  [address] [facility] [address]        January 27, 2020    Dear Rhys,    While refilling your prescription today, we noticed that you are due for an appointment with your provider.  We will refill your prescription for 30 days, but a follow-up appointment must be made before any additional refills can be approved.     Taking care of your health is important to us and we look forward to seeing you in the near future.  Please call us at [phone] or [phone] (or use [employer]) to schedule an appointment.     Please disregard this notice if you have already made an appointment.    Sincerely,        [facility]

## 2020-01-27 RX ORDER — TRIAMCINOLONE ACETONIDE 1 MG/G
CREAM TOPICAL 2 TIMES DAILY
Qty: 45 G | Refills: 0 | Status: SHIPPED | OUTPATIENT
Start: 2020-01-27

## 2020-01-27 RX ORDER — AMLODIPINE AND BENAZEPRIL HYDROCHLORIDE 5; 20 MG/1; MG/1
1 CAPSULE ORAL DAILY
Qty: 30 CAPSULE | Refills: 0 | Status: SHIPPED | OUTPATIENT
Start: 2020-01-27 | End: 2020-02-06

## 2020-01-27 NOTE — TELEPHONE ENCOUNTER
"Requested Prescriptions   Pending Prescriptions Disp Refills     triamcinolone (KENALOG) 0.1 % external cream 45 g 1       Topical Steroids and Nonsteroidals Protocol Passed - 1/23/2020  2:01 PM        Passed - Patient is age 6 or older        Passed - Authorizing prescriber's most recent note related to this medication read.     If refill request is for ophthalmic use, please forward request to provider for approval.          Passed - High potency steroid not ordered        Passed - Recent (12 mo) or future (30 days) visit within the authorizing provider's specialty     Patient has had an office visit with the authorizing provider or a provider within the authorizing providers department within the previous 12 mos or has a future within next 30 days. See \"Patient Info\" tab in inbasket, or \"Choose Columns\" in Meds & Orders section of the refill encounter.              Passed - Medication is active on med list        amLODIPine-benazepril (LOTREL) 5-20 MG capsule 90 capsule 0     Sig: Take 1 capsule by mouth daily       Calcium Channel Blockers Protocol  Failed - 1/23/2020  2:01 PM        Failed - Blood pressure under 140/90 in past 12 months     BP Readings from Last 3 Encounters:   12/13/18 126/80   09/10/18 138/88   05/30/17 112/76                 Failed - Normal serum creatinine on file in past 12 months     Recent Labs   Lab Test 11/19/18  0755   CR 0.91             Passed - Recent (12 mo) or future (30 days) visit within the authorizing provider's specialty     Patient has had an office visit with the authorizing provider or a provider within the authorizing providers department within the previous 12 mos or has a future within next 30 days. See \"Patient Info\" tab in inbasket, or \"Choose Columns\" in Meds & Orders section of the refill encounter.              Passed - Medication is active on med list        Passed - Patient is age 18 or older        Medication is being filled for 1 time refill only due to:  " Patient needs to be seen because it has been more than one year since last visit.

## 2020-02-06 ENCOUNTER — OFFICE VISIT (OUTPATIENT)
Dept: INTERNAL MEDICINE | Facility: CLINIC | Age: 55
End: 2020-02-06
Payer: COMMERCIAL

## 2020-02-06 VITALS
RESPIRATION RATE: 16 BRPM | HEART RATE: 92 BPM | OXYGEN SATURATION: 99 % | TEMPERATURE: 98.9 F | BODY MASS INDEX: 35.84 KG/M2 | DIASTOLIC BLOOD PRESSURE: 76 MMHG | SYSTOLIC BLOOD PRESSURE: 122 MMHG | HEIGHT: 69 IN | WEIGHT: 242 LBS

## 2020-02-06 DIAGNOSIS — Z13.1 SCREENING FOR DIABETES MELLITUS: ICD-10-CM

## 2020-02-06 DIAGNOSIS — Z13.6 CARDIOVASCULAR SCREENING; LDL GOAL LESS THAN 100: ICD-10-CM

## 2020-02-06 DIAGNOSIS — E66.01 SEVERE OBESITY WITH BODY MASS INDEX (BMI) OF 35.0 TO 39.9 WITH COMORBIDITY (H): ICD-10-CM

## 2020-02-06 DIAGNOSIS — Z00.01 ENCOUNTER FOR ROUTINE ADULT MEDICAL EXAM WITH ABNORMAL FINDINGS: Primary | ICD-10-CM

## 2020-02-06 DIAGNOSIS — Z12.5 SCREENING FOR PROSTATE CANCER: ICD-10-CM

## 2020-02-06 DIAGNOSIS — I10 ESSENTIAL HYPERTENSION: ICD-10-CM

## 2020-02-06 PROCEDURE — 99213 OFFICE O/P EST LOW 20 MIN: CPT | Mod: 25 | Performed by: INTERNAL MEDICINE

## 2020-02-06 PROCEDURE — 99396 PREV VISIT EST AGE 40-64: CPT | Performed by: INTERNAL MEDICINE

## 2020-02-06 RX ORDER — AMLODIPINE AND BENAZEPRIL HYDROCHLORIDE 5; 20 MG/1; MG/1
1 CAPSULE ORAL DAILY
Qty: 90 CAPSULE | Refills: 3 | Status: SHIPPED | OUTPATIENT
Start: 2020-02-06

## 2020-02-06 ASSESSMENT — MIFFLIN-ST. JEOR: SCORE: 1915.14

## 2020-02-06 NOTE — PROGRESS NOTES
SUBJECTIVE:   CC: Rhys Manley is an 54 year old male who presents for preventative health visit and follow-up re: HTN    Healthy Habits:     Getting at least 3 servings of Calcium per day:  Yes    Bi-annual eye exam:  Yes    Dental care twice a year:  Yes    Sleep apnea or symptoms of sleep apnea:  None    Diet:  Regular (no restrictions)    Frequency of exercise:  None    Duration of exercise:  N/A    Taking medications regularly:  Yes    Barriers to taking medications:  None    Medication side effects:  Not applicable    PHQ-2 Total Score: 0    Additional concerns today:  No        Today's PHQ-2 Score:   PHQ-2 ( 1999 Pfizer) 2/4/2020   Q1: Little interest or pleasure in doing things 0   Q2: Feeling down, depressed or hopeless 0   PHQ-2 Score 0   Q1: Little interest or pleasure in doing things Not at all   Q2: Feeling down, depressed or hopeless Not at all   PHQ-2 Score 0       Abuse: Current or Past(Physical, Sexual or Emotional)- No  Do you feel safe in your environment? Yes    Have you ever done Advance Care Planning? (For example, a Health Directive, POLST, or a discussion with a medical provider or your loved ones about your wishes): No.     Social History     Tobacco Use     Smoking status: Never Smoker     Smokeless tobacco: Never Used   Substance Use Topics     Alcohol use: Yes     Comment: occ (every 1-2 weeks on average)     If you drink alcohol do you typically have >3 drinks per day or >7 drinks per week? No    Alcohol Use 2/4/2020   Prescreen: >3 drinks/day or >7 drinks/week? No   Prescreen: >3 drinks/day or >7 drinks/week? -       Last PSA:   PSA   Date Value Ref Range Status   05/30/2017 0.47 0 - 4 ug/L Final     Comment:     Assay Method:  Chemiluminescence using Siemens Vista analyzer       Reviewed orders with patient. Reviewed health maintenance and updated orders accordingly - Yes  Labs reviewed in EPIC    Reviewed and updated as needed this visit by clinical staff  Allergies  Meds      "    Reviewed and updated as needed this visit by Provider            Review of Systems  CONSTITUTIONAL: NEGATIVE for fever, chills. Weight up 1 pound from last visit. Good energy  INTEGUMENTARY/SKIN: NEGATIVE for worrisome rashes, moles or lesions  EYES: NEGATIVE for vision changes or irritation. Has Eye exam  Scheduled March 2020  ENT: NEGATIVE for ear, mouth and throat problems  RESP: NEGATIVE for significant cough or SOB  CV: NEGATIVE for chest pain, palpitations or peripheral edema. On BP medication (Lotrel)  GI: NEGATIVE for nausea, abdominal pain, heartburn, or change in bowel habits   male: negative for dysuria, hematuria, decreased urinary stream, erectile dysfunction, urethral discharge  MUSCULOSKELETAL: NEGATIVE for significant arthralgias or myalgia that limit activity. Previous left shoulder pain that resolved after 6 weeks  NEURO: NEGATIVE for weakness, dizziness or paresthesias currently  PSYCHIATRIC: NEGATIVE for changes in mood or affect    OBJECTIVE:   /76   Pulse 92   Temp 98.9  F (37.2  C) (Temporal)   Resp 16   Ht 1.74 m (5' 8.5\")   Wt 109.8 kg (242 lb)   SpO2 99%   BMI 36.26 kg/m      Physical Exam  General appearance -   alert, no distress  Skin - No rashes or lesions. Few small seborrheic keratosis on back  Head - normocephalic, atraumatic  Eyes - ALPHONSO, EOMI, fundi exam with nondilated pupils negative.   Ears - External ears normal. Canals clear. TM's normal.  Nose/Sinuses - Nares normal. Septum midline. Mucosa normal. No drainage or sinus tenderness.  Oropharynx - No erythema, no adenopathy, no exudates. Narrowed airway due to obesity  Neck - Supple without adenopathy or thyromegaly. No bruits.  Lungs - Clear to auscultation without wheezes/rhonchi.  Heart - Regular rate and rhythm without murmurs, clicks, or gallops.  Nodes - No supraclavicular, axillary, or inguinal adenopathy palpable.  Abdomen - Abdomen obese, soft, non-tender. BS normal. No masses or hepatosplenomegaly " "palpable. No bruits.  Extremities -No cyanosis, clubbing or edema.    Musculoskeletal - Spine ROM normal. Muscular strength intact.   Peripheral pulses - radial=4/4, femoral=4/4, posterior tibial=4/4, dorsalis pedis=4/4,  Neuro - Gait normal. Reflexes normal and symmetric. Sensation grossly WNL.  Genital - Normal-appearing male external genitalia. No scrotal masses or inguinal hernia palpable.   Rectal - Guaic negative stool. Normal tone. Prostate 1 plusin size to palpation. No rectal masses or prostate nodularity palpable      ASSESSMENT/PLAN:   1. Encounter for routine adult medical exam with abnormal findings  See below for health maintenance reading.  Otherwise up-to-date    2. Essential hypertension  Controlled.  Continue current medication.  Future labs ordered  - amLODIPine-benazepril (LOTREL) 5-20 MG capsule; Take 1 capsule by mouth daily  Dispense: 90 capsule; Refill: 3  - Comprehensive metabolic panel; Future    3. Severe obesity with body mass index (BMI) of 35.0 to 39.9 with comorbidity (H)  Obesity contributing to hypertension.  Weight minimally changed over last year.  See counseling below    4. Screening for prostate cancer   PSA ordered for intermittent monitoring after discussion with pt and pt preference. Not doing annual PSA per USPTF recs  - Prostate spec antigen screen; Future    5. Screening for diabetes mellitus  - Comprehensive metabolic panel; Future    6. CARDIOVASCULAR SCREENING; LDL GOAL LESS THAN 100  - Lipid panel reflex to direct LDL Fasting; Future      COUNSELING:   Reviewed preventive health counseling, as reflected in patient instructions    Estimated body mass index is 34.58 kg/m  as calculated from the following:    Height as of 5/30/17: 1.778 m (5' 10\").    Weight as of 12/13/18: 109.3 kg (241 lb).     Weight management plan: Discussed healthy diet and exercise guidelines     reports that he has never smoked. He has never used smokeless tobacco.      Counseling Resources:  ATP " IV Guidelines  Pooled Cohorts Equation Calculator  FRAX Risk Assessment  ICSI Preventive Guidelines  Dietary Guidelines for Americans, 2010  USDA's MyPlate  ASA Prophylaxis  Lung CA Screening      PLAN:    Roger Brito MD  [facility]

## 2020-02-06 NOTE — PATIENT INSTRUCTIONS
Continue current meds  Prescriptions refilled.    Call  [phone] or use [employer] to schedule a future lab appointment  fasting in 1-2 weeks.   For fasting labs, please refrain from eating for 8 hours or more.   Drink 2 glasses of water before your lab appointment. It is fine to take your  oral medications on the morning of the lab test as usual  Reduce calorie/carbohydrate (sugar, bread, potato, pasta, rice, alcohol etc)  intake in diet.  Increase color on your plate with fruits and vegetables. Increase  frequency of walking or other aerobic exercise as able (goal is daily)  Eye appointment as scheduled in March  Check with insurance or speak with your pharmacist re: Shingrix vaccine coverage for shingles prevention.  This is a 2 shot series done 2-6 months apart  Pt was informed regarding extra E&M billing for management of new or established medical issues not related to today's wellness visit  See me annually or earlier as needed

## 2020-02-14 DIAGNOSIS — Z13.1 SCREENING FOR DIABETES MELLITUS: ICD-10-CM

## 2020-02-14 DIAGNOSIS — I10 ESSENTIAL HYPERTENSION: ICD-10-CM

## 2020-02-14 DIAGNOSIS — Z13.6 CARDIOVASCULAR SCREENING; LDL GOAL LESS THAN 100: ICD-10-CM

## 2020-02-14 DIAGNOSIS — Z12.5 SCREENING FOR PROSTATE CANCER: ICD-10-CM

## 2020-02-14 LAB
ALBUMIN SERPL-MCNC: 3.8 G/DL (ref 3.4–5)
ALP SERPL-CCNC: 55 U/L (ref 40–150)
ALT SERPL W P-5'-P-CCNC: 31 U/L (ref 0–70)
ANION GAP SERPL CALCULATED.3IONS-SCNC: 7 MMOL/L (ref 3–14)
AST SERPL W P-5'-P-CCNC: 12 U/L (ref 0–45)
BILIRUB SERPL-MCNC: 0.5 MG/DL (ref 0.2–1.3)
BUN SERPL-MCNC: 19 MG/DL (ref 7–30)
CALCIUM SERPL-MCNC: 8.8 MG/DL (ref 8.5–10.1)
CHLORIDE SERPL-SCNC: 105 MMOL/L (ref 94–109)
CHOLEST SERPL-MCNC: 165 MG/DL
CO2 SERPL-SCNC: 26 MMOL/L (ref 20–32)
CREAT SERPL-MCNC: 0.86 MG/DL (ref 0.66–1.25)
GFR SERPL CREATININE-BSD FRML MDRD: >90 ML/MIN/{1.73_M2}
GLUCOSE SERPL-MCNC: 99 MG/DL (ref 70–99)
HDLC SERPL-MCNC: 39 MG/DL
LDLC SERPL CALC-MCNC: 95 MG/DL
NONHDLC SERPL-MCNC: 126 MG/DL
POTASSIUM SERPL-SCNC: 4.6 MMOL/L (ref 3.4–5.3)
PROT SERPL-MCNC: 7.5 G/DL (ref 6.8–8.8)
PSA SERPL-ACNC: 0.9 UG/L (ref 0–4)
SODIUM SERPL-SCNC: 138 MMOL/L (ref 133–144)
TRIGL SERPL-MCNC: 153 MG/DL

## 2020-02-14 PROCEDURE — G0103 PSA SCREENING: HCPCS | Performed by: INTERNAL MEDICINE

## 2020-02-14 PROCEDURE — 80061 LIPID PANEL: CPT | Performed by: INTERNAL MEDICINE

## 2020-02-14 PROCEDURE — 80053 COMPREHEN METABOLIC PANEL: CPT | Performed by: INTERNAL MEDICINE

## 2020-02-14 PROCEDURE — 36415 COLL VENOUS BLD VENIPUNCTURE: CPT | Performed by: INTERNAL MEDICINE

## 2020-11-16 ENCOUNTER — HEALTH MAINTENANCE LETTER (OUTPATIENT)
Age: 55
End: 2020-11-16

## 2021-04-04 ENCOUNTER — HEALTH MAINTENANCE LETTER (OUTPATIENT)
Age: 56
End: 2021-04-04

## 2021-09-18 ENCOUNTER — HEALTH MAINTENANCE LETTER (OUTPATIENT)
Age: 56
End: 2021-09-18

## 2022-04-30 ENCOUNTER — HEALTH MAINTENANCE LETTER (OUTPATIENT)
Age: 57
End: 2022-04-30

## 2022-11-20 ENCOUNTER — HEALTH MAINTENANCE LETTER (OUTPATIENT)
Age: 57
End: 2022-11-20